# Patient Record
Sex: MALE | Race: WHITE | URBAN - METROPOLITAN AREA
[De-identification: names, ages, dates, MRNs, and addresses within clinical notes are randomized per-mention and may not be internally consistent; named-entity substitution may affect disease eponyms.]

---

## 2017-10-18 PROBLEM — D75.839 THROMBOCYTOSIS: Status: ACTIVE | Noted: 2017-10-18

## 2017-10-18 PROBLEM — R91.8 LUNG MASS: Status: ACTIVE | Noted: 2017-10-18

## 2017-10-18 PROBLEM — C78.00 METASTASIS TO LUNG (HCC): Status: ACTIVE | Noted: 2017-10-18

## 2017-10-18 PROBLEM — D64.9 ANEMIA: Status: ACTIVE | Noted: 2017-10-18

## 2017-10-18 PROBLEM — N61.1 BREAST ABSCESS: Status: ACTIVE | Noted: 2017-10-18

## 2017-10-18 PROBLEM — A41.9 SEPSIS (HCC): Status: ACTIVE | Noted: 2017-10-18

## 2017-10-18 PROBLEM — E87.29 HIGH ANION GAP METABOLIC ACIDOSIS: Status: ACTIVE | Noted: 2017-10-18

## 2017-10-19 PROBLEM — C79.9 METASTASIS OF UNKNOWN PRIMARY (HCC): Status: ACTIVE | Noted: 2017-10-19

## 2017-10-22 ENCOUNTER — CLINICAL DOCUMENTATION (OUTPATIENT)
Dept: RADIATION ONCOLOGY | Age: 52
End: 2017-10-22

## 2017-10-22 NOTE — PROGRESS NOTES
Have been asked to see this unfortunate 44 yo wm , long smoking history with multiple large brain mets and poor performance. Feel hospice are is best . See no role for palliative XRt at this time . Explained situation to family .  Formal note dictated